# Patient Record
Sex: FEMALE | Race: WHITE
[De-identification: names, ages, dates, MRNs, and addresses within clinical notes are randomized per-mention and may not be internally consistent; named-entity substitution may affect disease eponyms.]

---

## 2023-03-20 ENCOUNTER — HOSPITAL ENCOUNTER (EMERGENCY)
Dept: HOSPITAL 97 - ER | Age: 38
LOS: 1 days | Discharge: HOME | End: 2023-03-21
Payer: COMMERCIAL

## 2023-03-20 DIAGNOSIS — Z88.5: ICD-10-CM

## 2023-03-20 DIAGNOSIS — Z88.1: ICD-10-CM

## 2023-03-20 DIAGNOSIS — Z88.0: ICD-10-CM

## 2023-03-20 DIAGNOSIS — U07.1: Primary | ICD-10-CM

## 2023-03-20 PROCEDURE — 0240U: CPT

## 2023-03-20 PROCEDURE — 99283 EMERGENCY DEPT VISIT LOW MDM: CPT

## 2023-03-20 PROCEDURE — 87070 CULTURE OTHR SPECIMN AEROBIC: CPT

## 2023-03-20 PROCEDURE — 87081 CULTURE SCREEN ONLY: CPT

## 2023-03-21 VITALS — OXYGEN SATURATION: 96 % | SYSTOLIC BLOOD PRESSURE: 98 MMHG | DIASTOLIC BLOOD PRESSURE: 67 MMHG | TEMPERATURE: 99.3 F

## 2023-03-21 LAB — SARS-COV-2 RNA RESP QL NAA+PROBE: POSITIVE

## 2023-03-21 NOTE — EDPHYS
Physician Documentation                                                                           

 [facility]                                                                 

Name: Silvina Young                                                                             

Age: 37 yrs                                                                                       

Sex: Female                                                                                       

: 1985                                                                                   

MRN: [id number]                                                                                   

Arrival Date: 2023                                                                          

Time: 21:48                                                                                       

Account#: [id number]                                                                            

Bed 7                                                                                             

Private MD:                                                                                       

ED Physician Lalit Godoy                                                                         

HPI:                                                                                              

                                                                                             

22:45 This 37 yrs old Female presents to ER via Ambulatory with complaints of Fever, Covid+   cp  

      Home Test.                                                                                  

22:45 The patient reports fever, that was measured at 104 degrees Fahrenheit. Onset: The      cp  

      symptoms/episode began/occurred today. Patient reports started feeling bad yesterday        

      with symptoms worsening while driving to work this morning. Took home COVID-19 test         

      that was positive.                                                                          

                                                                                                  

Historical:                                                                                       

- Allergies:                                                                                      

22:06 Amoxicillin;                                                                            as6 

22:06 PENICILLINS;                                                                            as6 

22:06 Codeine;                                                                                as6 

22:06 Tramadol HCl;                                                                           as6 

22:06 Naproxen;                                                                               as6 

- PMHx:                                                                                           

22:06 PCOS; Fibromyalgia; inersititais; Anxiety;                                              as6 

- PSHx:                                                                                           

22:06 shoulder;                                                                               as6 

                                                                                                  

- Immunization history:: Client reports having NOT received the Covid vaccine.                    

- Social history:: Smoking status: Patient/guardian denies using tobacco.                         

                                                                                                  

                                                                                                  

ROS:                                                                                              

22:50 Constitutional: Positive for body aches, chills, fever, Negative for poor PO intake.    cp  

22:50 Cardiovascular: Negative for chest pain, edema.                                         cp  

22:50 Respiratory: Positive for cough, Negative for shortness of breath, wheezing.                

22:50 Abdomen/GI: Positive for nausea, Negative for abdominal pain, vomiting, diarrhea,           

      constipation.                                                                               

22:50 Eyes: Negative for injury, pain, redness, and discharge.                                cp  

22:50 ENT: Positive for sore throat, Negative for drainage from ear(s), ear pain, difficulty  cp  

      swallowing, difficulty handling secretions.                                                 

22:50 Neck: Negative for pain with movement, pain at rest, stiffness.                             

22:50 Skin: Negative for rash.                                                                    

22:50 Neuro: Negative for altered mental status, dizziness, headache, weakness.                   

22:50 All other systems are negative.                                                             

                                                                                                  

Exam:                                                                                             

22:55 Constitutional: The patient appears in no acute distress, alert, awake,                 cp  

      non-diaphoretic, non-toxic, well developed, well nourished, obese.                          

22:55 Head/Face:  Normocephalic, atraumatic.                                                  cp  

22:55 Eyes: Periorbital structures: appear normal, Conjunctiva: normal, no exudate, no            

      injection, Sclera: no appreciated abnormality, Lids and lashes: appear normal,              

      bilaterally.                                                                                

22:55 ENT: External ear(s): are unremarkable, Ear canal(s): are normal, clear, TM's:              

      dullness, bilaterally, Nose: is normal, Mouth: Lips: moist, Oral mucosa: moist,             

      Posterior pharynx: Airway: no evidence of obstruction, patent, Tonsils: with erythema,      

      no enlargement, no exudate, swelling, is not appreciated, erythema, that is moderate,       

      exudate, is not appreciated.                                                                

22:55 Neck: ROM/movement: is normal, is supple, without pain, no range of motions                 

      limitations, no meningismus, Lymph nodes: no appreciated lymphadenopathy.                   

22:55 Chest/axilla: Inspection: normal.                                                           

22:55 Cardiovascular: Rate: normal, Rhythm: regular, Edema: is not appreciated, JVD: is not       

      appreciated.                                                                                

22:55 Respiratory: the patient does not display signs of respiratory distress,  Respirations:     

      normal, no use of accessory muscles, no retractions, labored breathing, is not present,     

      Breath sounds: are clear throughout, no decreased breath sounds, no stridor, no             

      wheezing.                                                                                   

22:55 Abdomen/GI: Exam negative for discomfort, distension, guarding, Inspection: abdomen         

      appears normal.                                                                             

22:55 Back: CVA tenderness, is absent.                                                            

22:55 Skin: no rash present.                                                                      

22:55 Neuro: Orientation: to person, place \T\ time. Mentation: is normal, Motor: moves all       

      fours, strength is normal, Gait: is steady, at a normal pace, without difficulty.           

                                                                                                  

Vital Signs:                                                                                      

22:02 BP 91 / 75; Pulse 83; Resp 18 S; Temp 101.6(O); Pulse Ox 94% on R/A; Weight 112.49 kg   as6 

      (R); Height 5 ft. 4 in. (R); Pain 4/10;                                                     

                                                                                             

01:10 BP 98 / 67; Pulse 69; Resp 18; Temp 99.3(O); Pulse Ox 96% on R/A;                       ll3 

                                                                                             

22:02 Body Mass Index 42.57 (112.49 kg, 162.56 cm)                                            as6 

                                                                                             

22:02 Pain Scale: Adult                                                                       as6 

                                                                                                  

MDM:                                                                                              

                                                                                             

22:12 Patient medically screened.                                                             cp  

23:00 Differential diagnosis: viral Infection, bacterial infection, pneumonia UTI,            cp  

      gastroenteritis, meningitis, uti.                                                           

                                                                                             

01:49 Data reviewed: vital signs, nurses notes, lab test result(s).                           cp  

01:49 Consideration of Admission/Observation Escalation of care including                     cp  

      admission/observation considered. I considered the following discharge prescriptions or     

      medication management in the emergency department Medications were administered in the      

      Emergency Department. See MAR. Test considered but Not performed: Labs: cbc, bmp.           

      Counseling: I had a detailed discussion with the patient and/or guardian regarding: the     

      historical points, exam findings, and any diagnostic results supporting the                 

      discharge/admit diagnosis, lab results, the need for outpatient follow up, a family         

      practitioner, to return to the emergency department if symptoms worsen or persist or if     

      there are any questions or concerns that arise at home. Response to treatment: the          

      patient's symptoms have mildly improved after treatment, and as a result, I will            

      discharge patient.                                                                          

                                                                                                  

                                                                                             

22:30 Order name: COVID-19/FLU A+B; Complete Time: 01:04                                      cp  

                                                                                             

01:05 Interpretation: Reviewed.                                                               cp  

                                                                                             

22:30 Order name: Strep; Complete Time: 01:04                                                 cp  

                                                                                             

00:40 Order name: Throat Culture                                                              EDMS

                                                                                             

01:05 Order name: Vital Signs: please update to include temp; Complete Time: 01:11            cp  

                                                                                                  

Administered Medications:                                                                         

                                                                                             

23:33 Drug: Ondansetron PO 4 mg Route: PO;                                                    as6 

                                                                                             

02:02 Follow up: Response: No adverse reaction                                                ll3 

                                                                                             

23:33 Drug: Acetaminophen PO 1000 mg Route: PO;                                               as6 

                                                                                             

02:02 Follow up: Response: No adverse reaction; Temperature is decreased                      ll3 

                                                                                                  

                                                                                                  

Disposition Summary:                                                                              

23 01:49                                                                                    

Discharge Ordered                                                                                 

      Location: Home                                                                          cp  

      Problem: new                                                                            cp  

      Symptoms: have improved                                                                 cp  

      Condition: Stable                                                                       cp  

      Diagnosis                                                                                   

        - SARS-associated coronavirus as the cause of diseases classified elsewhere           cp  

      Followup:                                                                               cp  

        - With: Private Physician                                                                  

        - When: 2 - 3 days                                                                         

        - Reason: Worsening of condition                                                           

      Discharge Instructions:                                                                     

        - Discharge Summary Sheet                                                             cp  

        - Aspirin and Your Heart                                                              cp  

        - Form - Excuse from Work, School, or Physical Activity                               cp  

        - COVID-19                                                                            cp  

        - How to Protect Yourself and Others - [facility] (2022)                               cp  

        - 10 Things You Can Do to Manage Your COVID-19 Symptoms at Home - [facility] (2021)    cp  

        - COVID-19: Quarantine and Isolation - [facility] (2022)                               cp  

        - COVID-19: What to Do If You Are Sick - [facility] (2022)                             cp  

      Forms:                                                                                      

        - Medication Reconciliation Form                                                      cp  

        - Thank You Letter                                                                    cp  

        - Antibiotic Education                                                                cp  

        - Prescription Opioid Use                                                             cp  

      Prescriptions:                                                                              

        - Bromfed DM 2-30-10 mg/5 mL Oral syrup                                                    

            - administer 10 milliliter by ORAL route every 6 hours as needed for cold         cp  

      symptoms; 180 milliliter; Refills: 0, Product Selection Permitted                           

        - Paxlovid (EUA) 300 mg (150 mg x 2)-100 mg Oral Tablet, Dose Pack                         

            - take 1 dose pack by ORAL route per package directions; 30 tablet; Refills: 0,   cp  

      Product Selection Permitted                                                                 

        - Zofran 4 mg Oral Tablet                                                                  

            - take 1 tablet by ORAL route every 12 hours As needed; 20 tablet; Refills: 0,    cp  

      Product Selection Permitted                                                                 

Signatures:                                                                                       

Dispatcher [employer]                           EDMS                                                 

Vish Trejo PA PA   cp                                                   

Ward Lubin RN                      RN   as6                                                  

Leonel Porter RN   ll3                                                  

                                                                                                  

**************************************************************************************************

## 2023-03-21 NOTE — ER
Nurse's Notes                                                                                     

 [facility]                                                                 

Name: Silvina Young                                                                             

Age: 37 yrs                                                                                       

Sex: Female                                                                                       

: 1985                                                                                   

MRN: [id number]                                                                                   

Arrival Date: 2023                                                                          

Time: 21:48                                                                                       

Account#: [id number]                                                                            

Bed 7                                                                                             

Private MD:                                                                                       

Diagnosis: SARS-associated coronavirus as the cause of diseases classified elsewhere              

                                                                                                  

Presentation:                                                                                     

                                                                                             

22:02 Chief complaint: Patient states: "I woke up this morning with a fever and took a at     as6 

      home covid test and it was positive and I can't seem to get my fever down" pt took          

      Tylenol at 2100. Coronavirus screen: Client presents with at least one sign or symptom      

      that may indicate coronavirus-19. Client reports previous positive COVID test result.       

      Ebola Screen: No symptoms or risks identified at this time. Initial Sepsis Screen: Does     

      the patient meet any 2 criteria? No. Patient's initial sepsis screen is negative. Does      

      the patient have a suspected source of infection? No. Patient's initial sepsis screen       

      is negative. Risk Assessment: Do you want to hurt yourself or someone else? Patient         

      reports no desire to harm self or others. Onset of symptoms was 2023.             

22:02 Method Of Arrival: Ambulatory                                                           as6 

22:02 Acuity: EDI 3                                                                           as6 

                                                                                                  

Historical:                                                                                       

- Allergies:                                                                                      

22:06 Amoxicillin;                                                                            as6 

22:06 PENICILLINS;                                                                            as6 

22:06 Codeine;                                                                                as6 

22:06 Tramadol HCl;                                                                           as6 

22:06 Naproxen;                                                                               as6 

- PMHx:                                                                                           

22:06 PCOS; Fibromyalgia; inersititais; Anxiety;                                              as6 

- PSHx:                                                                                           

22:06 shoulder;                                                                               as6 

                                                                                                  

- Immunization history:: Client reports having NOT received the Covid vaccine.                    

- Social history:: Smoking status: Patient/guardian denies using tobacco.                         

                                                                                                  

                                                                                                  

Screenin/21                                                                                             

02:00 [facility] ED Fall Risk Assessment (Adult) History of falling in the last 3 months,       ll3 

      including since admission No falls in past 3 months (0 pts) Confusion or Disorientation     

      No (0 pts) Intoxicated or Sedated No (0 pts) Impaired Gait No (0 pts) Mobility Assist       

      Device Used No (0 pt) Altered Elimination No (0 pt) Score/Fall Risk Level 0 - 2 = Low       

      Risk Oriented to surroundings, Maintained a safe environment, Educated pt \T\ family on     

      fall prevention, incl call for assistance when getting out of bed. Abuse screen: Denies     

      threats or abuse. Denies injuries from another. Nutritional screening: No deficits          

      noted. Tuberculosis screening: No symptoms or risk factors identified.                      

                                                                                                  

Assessment:                                                                                       

01:30 General: Appears uncomfortable, Behavior is calm, cooperative. General: Reports fever   ll3 

      for 12-24 hours. Pain: Complains of pain in right aspect of posterior pharynx. Neuro:       

      Level of Consciousness is awake, alert, obeys commands, Oriented to person, place,          

      time, situation. Respiratory: Respiratory effort is even, unlabored, Respiratory            

      pattern is regular, symmetrical. Derm: Skin is pink, warm \T\ dry.                          

                                                                                                  

Vital Signs:                                                                                      

                                                                                             

22:02 BP 91 / 75; Pulse 83; Resp 18 S; Temp 101.6(O); Pulse Ox 94% on R/A; Weight 112.49 kg   as6 

      (R); Height 5 ft. 4 in. (R); Pain 4/10;                                                     

                                                                                             

01:10 BP 98 / 67; Pulse 69; Resp 18; Temp 99.3(O); Pulse Ox 96% on R/A;                       ll3 

                                                                                             

22:02 Body Mass Index 42.57 (112.49 kg, 162.56 cm)                                            as6 

                                                                                             

22:02 Pain Scale: Adult                                                                       as6 

                                                                                                  

ED Course:                                                                                        

                                                                                             

21:48 Patient arrived in ED.                                                                  ja2 

21:51 Vish Trejo PA is PHCP.                                                                cp  

21:51 Lalit Godoy MD is Attending Physician.                                                cp  

22:06 Triage completed.                                                                       as6 

22:08 Arm band placed on.                                                                     as6 

                                                                                             

02:00 Patient has correct armband on for positive identification. Placed in gown. Bed in low  ll3 

      position. Call light in reach. Side rails up X 1.                                           

02:02 No provider procedures requiring assistance completed. Patient did not have IV access   ll3 

      during this emergency room visit.                                                           

                                                                                                  

Administered Medications:                                                                         

                                                                                             

23:33 Drug: Ondansetron PO 4 mg Route: PO;                                                    as6 

                                                                                             

02:02 Follow up: Response: No adverse reaction                                                ll3 

                                                                                             

23:33 Drug: Acetaminophen PO 1000 mg Route: PO;                                               as6 

                                                                                             

02:02 Follow up: Response: No adverse reaction; Temperature is decreased                      ll3 

                                                                                                  

                                                                                                  

Medication:                                                                                       

02:00 VIS not applicable for this client.                                                     ll3 

                                                                                                  

Outcome:                                                                                          

01:49 Discharge ordered by MD.                                                                fede  

02:02 Discharged to home ambulatory.                                                          ll3 

02:02 Condition: stable                                                                           

02:02 Discharge instructions given to patient, Instructed on discharge instructions, follow       

      up and referral plans. medication usage, Demonstrated understanding of instructions,        

      follow-up care, medications, Prescriptions given X 3.                                       

02:02 Patient left the ED.                                                                    ll3 

                                                                                                  

Signatures:                                                                                       

Vish Trejo PA PA cp Alexander, Jessica ja2                                                  

Ward Lubin RN                      RN   as6                                                  

Leonel Porter RN                      RN   ll3                                                  

                                                                                                  

**************************************************************************************************